# Patient Record
Sex: MALE | Employment: FULL TIME | URBAN - METROPOLITAN AREA
[De-identification: names, ages, dates, MRNs, and addresses within clinical notes are randomized per-mention and may not be internally consistent; named-entity substitution may affect disease eponyms.]

---

## 2020-12-01 ENCOUNTER — TRANSFERRED RECORDS (OUTPATIENT)
Dept: HEALTH INFORMATION MANAGEMENT | Facility: CLINIC | Age: 60
End: 2020-12-01

## 2020-12-08 ENCOUNTER — TRANSFERRED RECORDS (OUTPATIENT)
Dept: HEALTH INFORMATION MANAGEMENT | Facility: CLINIC | Age: 60
End: 2020-12-08

## 2021-03-25 ENCOUNTER — TRANSFERRED RECORDS (OUTPATIENT)
Dept: HEALTH INFORMATION MANAGEMENT | Facility: CLINIC | Age: 61
End: 2021-03-25

## 2021-04-08 ENCOUNTER — TRANSFERRED RECORDS (OUTPATIENT)
Dept: HEALTH INFORMATION MANAGEMENT | Facility: CLINIC | Age: 61
End: 2021-04-08

## 2021-04-20 ENCOUNTER — TRANSFERRED RECORDS (OUTPATIENT)
Dept: HEALTH INFORMATION MANAGEMENT | Facility: CLINIC | Age: 61
End: 2021-04-20

## 2021-05-04 ENCOUNTER — TRANSFERRED RECORDS (OUTPATIENT)
Dept: HEALTH INFORMATION MANAGEMENT | Facility: CLINIC | Age: 61
End: 2021-05-04

## 2021-05-04 ENCOUNTER — MEDICAL CORRESPONDENCE (OUTPATIENT)
Dept: HEALTH INFORMATION MANAGEMENT | Facility: CLINIC | Age: 61
End: 2021-05-04

## 2021-05-06 ENCOUNTER — TRANSCRIBE ORDERS (OUTPATIENT)
Dept: OTHER | Age: 61
End: 2021-05-06

## 2021-05-06 DIAGNOSIS — K43.9 SPIGELIAN HERNIA: Primary | ICD-10-CM

## 2021-05-07 NOTE — TELEPHONE ENCOUNTER
REFERRAL INFORMATION:    Referring Provider:  Dr. Preston Prince     Referring Clinic:  [facility]     Reason for Visit/Diagnosis: Incisional/ Spigelian Hernia        FUTURE VISIT INFORMATION:    Appointment Date: 5/10/2021    Appointment Time: 11 AM      NOTES RECORD STATUS  DETAILS   OFFICE NOTE from Referring Provider Care Everywhere 5/27/2020, 5/18/2020, 2/27/2020 Office visit with Dr. Prince      OFFICE NOTE from Other Specialists Care Everywhere 4/20/2021 Office visit with Katie Carlos PA-C ([facility])    3/25/2021 Office visit with Dr. Jairo Vernon ([facility])     12/1/2020, 10/2/2020, 12/18/19 Office visit with Maximiliano Kent PA-C ([facility]) - more office visits in     5/4/2020, 2/24/2020, 2/6/2020 Office visit with Dr. Andrew Leonard ([facility])     1/23/2020 Office visit with Dr. Concha Drew ([facility])      HOSPITAL DISCHARGE SUMMARY/ ED VISITS  Care Everywhere 5/9/2020 ([facility])      OPERATIVE REPORT Care Everywhere    ENDOSCOPY (EGD)  Care Everywhere Repair of incarcerated left inguinal hernia, repair of right Inguinal Hernia: 5/8/2020 ([facility])     PERTINENT LABS Care Everywhere    PATHOLOGY REPORTS (RELATED) N/A    IMAGING (CT, MRI, US, XR)  Care Everywhere [facility]:  - CT Abdomen Pelvis: 4/8/2021  - US Abdomen: 12/8/2020

## 2021-05-10 ENCOUNTER — OFFICE VISIT (OUTPATIENT)
Dept: SURGERY | Facility: CLINIC | Age: 61
End: 2021-05-10
Attending: SURGERY
Payer: COMMERCIAL

## 2021-05-10 ENCOUNTER — PRE VISIT (OUTPATIENT)
Dept: SURGERY | Facility: CLINIC | Age: 61
End: 2021-05-10

## 2021-05-10 VITALS
HEIGHT: 72 IN | DIASTOLIC BLOOD PRESSURE: 83 MMHG | OXYGEN SATURATION: 94 % | WEIGHT: 235 LBS | HEART RATE: 72 BPM | BODY MASS INDEX: 31.83 KG/M2 | TEMPERATURE: 98.4 F | SYSTOLIC BLOOD PRESSURE: 123 MMHG | RESPIRATION RATE: 18 BRPM

## 2021-05-10 DIAGNOSIS — K43.2 INCISIONAL HERNIA, WITHOUT OBSTRUCTION OR GANGRENE: Primary | ICD-10-CM

## 2021-05-10 PROCEDURE — 99203 OFFICE O/P NEW LOW 30 MIN: CPT | Performed by: SURGERY

## 2021-05-10 RX ORDER — ATORVASTATIN CALCIUM 40 MG/1
40 TABLET, FILM COATED ORAL
COMMUNITY
Start: 2021-04-12

## 2021-05-10 RX ORDER — TAMSULOSIN HYDROCHLORIDE 0.4 MG/1
CAPSULE ORAL
COMMUNITY
Start: 2021-01-11

## 2021-05-10 RX ORDER — METOPROLOL TARTRATE 50 MG
50 TABLET ORAL
COMMUNITY
Start: 2021-04-12

## 2021-05-10 RX ORDER — LISINOPRIL 20 MG/1
TABLET ORAL
COMMUNITY
Start: 2021-01-12

## 2021-05-10 RX ORDER — ALBUTEROL SULFATE 90 UG/1
2 AEROSOL, METERED RESPIRATORY (INHALATION)
COMMUNITY
Start: 2020-12-01

## 2021-05-10 SDOH — HEALTH STABILITY: MENTAL HEALTH: HOW MANY STANDARD DRINKS CONTAINING ALCOHOL DO YOU HAVE ON A TYPICAL DAY?: NOT ASKED

## 2021-05-10 SDOH — HEALTH STABILITY: MENTAL HEALTH: HOW OFTEN DO YOU HAVE 6 OR MORE DRINKS ON ONE OCCASION?: NOT ASKED

## 2021-05-10 SDOH — HEALTH STABILITY: MENTAL HEALTH: HOW OFTEN DO YOU HAVE A DRINK CONTAINING ALCOHOL?: NOT ASKED

## 2021-05-10 ASSESSMENT — MIFFLIN-ST. JEOR: SCORE: 1914.98

## 2021-05-10 ASSESSMENT — PAIN SCALES - GENERAL: PAINLEVEL: MILD PAIN (2)

## 2021-05-10 NOTE — PATIENT INSTRUCTIONS
You met with Dr. Tommy Preston.      Today's visit instructions:    Dr. Preston would like to review your case at our next Hernia Conference (last Wednesday of the month).  Our team will call you with the groups recommendations.     If you have questions please contact Kathleen YOUNG during regular clinic hours, Monday through Friday 7:30 AM - 4:00 PM, or you can contact us via [employer] at anytime.       If you have urgent needs after-hours, weekends, or holidays please call the hospital at [phone] and ask to speak with our on-call General Surgery Team.    Appointment [phone], option #1   Nurse Advice (Kathleen): [phone]   Surgery Scheduler (Ruthie): [phone]  Fax: [phone]

## 2021-05-10 NOTE — NURSING NOTE
"Chief Complaint   Patient presents with     Consult     Pt here for hernia consult       Vitals:    05/10/21 1105   BP: 123/83   BP Location: Left arm   Patient Position: Sitting   Cuff Size: Adult Large   Pulse: 72   Resp: 18   Temp: 98.4  F (36.9  C)   TempSrc: Oral   SpO2: 94%   Weight: 106.6 kg (235 lb)   Height: 1.822 m (5' 11.75\")       Body mass index is 32.09 kg/m .      EMILIANO DukesT                      "

## 2021-05-10 NOTE — PROGRESS NOTES
Brian Wyatt comes for a second opinion regarding the left flank hernia.  He underwent robotic assisted bilateral inguinal hernia repairs at [facility] in [place] last fall.  About 4 weeks postop he noticed some bulging in the left.  It is continued since it causes some discomfort with motion.  One note says that he feels a football when he bends over on the left side.  A CT scan was obtained that shows a hernia on the left that protrudes through the internal oblique and through the layers of the oblique muscles.  It appears that the external oblique is probably intact.  This looks to be lateral to the and intact left rectus abdominis muscle.    He saw the primary surgeon Dr. Prince who recommended a laparoscopic or robotic approach to repair with mesh from his note it appears that it would be an intraperitoneal onlay mesh.  Patient was sent here for second opinion by  for our hernia group.    Dr. Dominique's operative note reports that the peritoneal flap was made cephalad to the left arcuate line and that the internal oblique was taken down as part of the flap.  It is unclear to me what this means.    A/P: Complex incisional hernia after a robotic assisted transperitoneal  left inguinal hernia repair.  I will present this at our multidisciplinary hernia group for discussion.  An intraperitoneal onlay (IPOM) mesh will still result in a weakened abdominal wall.  I think will be difficult to secure.  We will also still result in a bulge most likely.  An alternative would be an attempt to reconstruct the abdominal wall layers probably with a sandwich of mesh.  This is what I would discuss with her hernia group.  Seems to me like the approach for this would be open as it would be hard to dissect both sides from from 1 laparoscopic approach.    I spent 40 minutes in conversation with the patient and reviewing medical records.    Ernst Preston MD    No past medical history on file.    No past surgical  "history on file.       Allergies   Allergen Reactions     Other Environmental Allergy Itching     Cats and dogs     Penicillins      Pravastatin      myalgias         Current Outpatient Medications:      albuterol (PROAIR HFA/PROVENTIL HFA/VENTOLIN HFA) 108 (90 Base) MCG/ACT inhaler, Inhale 2 puffs into the lungs, Disp: , Rfl:      atorvastatin (LIPITOR) 40 MG tablet, Take 40 mg by mouth, Disp: , Rfl:      lisinopril (ZESTRIL) 20 MG tablet, TAKE 1 TABLET BY MOUTH EVERY DAY, Disp: , Rfl:      metoprolol tartrate (LOPRESSOR) 50 MG tablet, Take 50 mg by mouth, Disp: , Rfl:      psyllium (METAMUCIL SMOOTH TEXTURE) 28 % packet, Take 1 packet by mouth, Disp: , Rfl:      tamsulosin (FLOMAX) 0.4 MG capsule, TAKE ONE CAPSULE BY MOUTH DAILY, Disp: , Rfl:     family history is not on file.    Social History     Tobacco Use     Smoking status: Former Smoker     Smokeless tobacco: Current User     Types: Chew   Substance Use Topics     Alcohol use: Not Currently     Drug use: Not Currently       EXAM  /83 (BP Location: Left arm, Patient Position: Sitting, Cuff Size: Adult Large)   Pulse 72   Temp 98.4  F (36.9  C) (Oral)   Resp 18   Ht 1.822 m (5' 11.75\")   Wt 106.6 kg (235 lb)   SpO2 94%   BMI 32.09 kg/m      Large abdomen, obvious bulging on left.  Incision sites healed.   No palp inguinal hernias  "

## 2021-05-26 ENCOUNTER — TEAM CONFERENCE (OUTPATIENT)
Dept: SURGERY | Facility: CLINIC | Age: 61
End: 2021-05-26

## 2021-05-26 NOTE — TELEPHONE ENCOUNTER
"Complex Hernia Monthly Conference Note   Date: May 26, 2021    Attendees: Dr. Quick, Dr. Hodge, Dr. Preston, Dr. Alamo, Dr. Miles and Kathleen Lyle RN, Chelle Rosado RN, Melina Velasco RN    Type of hernia: spigelian    Estimated body mass index is 32.09 kg/m  as calculated from the following:    Height as of 5/10/21: 1.822 m (5' 11.75\").    Weight as of 5/10/21: 106.6 kg (235 lb).    Wt Readings from Last 4 Encounters:   05/10/21 106.6 kg (235 lb)       History   Smoking Status     Former Smoker   Smokeless Tobacco     Current User     Types: Chew       Weight at time of initial consult: 235lb    HgbA1C: No results found for: A1C    No results found for: ALBUMIN    Is this a re-occurrence of hernia: YES    Is mesh in place: YES   Has there been multiple abdominal surgeries/previous repair: No    Fistulas: No  Is hernia is greater than 5 cm: unknown  Multiple hernias present: No  Is patient immunosuppressed: No  PAST MEDICAL HISTORY: No past medical history on file.    PAST SURGICAL HISTORY: No past surgical history on file.    Patient Plan:    CT reviewed: YES    Nictotine/Continine Test needed: NO        Patient will be contacted by Chelle Rosado RN regarding plan of care.   Spoke with patient and video visit scheduled for 6/14/21 at 0830 with Dr. Preston.       "

## 2021-06-11 ENCOUNTER — PATIENT OUTREACH (OUTPATIENT)
Dept: SURGERY | Facility: CLINIC | Age: 61
End: 2021-06-11

## 2021-06-11 NOTE — PROGRESS NOTES
Patient Telephone Reminder Call    Date of call:  06/11/21  Phone numbers:  Cell number on file:    Telephone Information:   Mobile [phone]       Reached patient/confirmed appointment:  No - left message:   on voicemail  Appointment with:   Dr. Tommy Preston  Reason for visit:  Hernia

## 2021-06-15 ENCOUNTER — PATIENT OUTREACH (OUTPATIENT)
Dept: SURGERY | Facility: CLINIC | Age: 61
End: 2021-06-15

## 2021-06-15 ENCOUNTER — TELEPHONE (OUTPATIENT)
Dept: SURGERY | Facility: CLINIC | Age: 61
End: 2021-06-15

## 2021-06-15 NOTE — TELEPHONE ENCOUNTER
Pt called and left a VM yesterday (at 3:55pm) stating he slept through 2 alarm clocks and missed his virtual appt with Dr. Preston.     Pt would like a call back to reschedule appt.      Mathieu Zelaya LPN

## 2021-06-15 NOTE — PROGRESS NOTES
Called patient to reschedule video visit with Dr. Preston. Scheduled and confirmed appointment with patient for 6/21/21 at 2:30pm.

## 2021-06-18 ENCOUNTER — PATIENT OUTREACH (OUTPATIENT)
Dept: SURGERY | Facility: CLINIC | Age: 61
End: 2021-06-18

## 2021-06-18 NOTE — PROGRESS NOTES
Pre Visit Call and Assessment    Date of call:  06/18/2021    Phone numbers:  Cell number on file:    Telephone Information:   Mobile [phone]       Reached patient/confirmed appointment:  No - left message:   on cell phone  Patient care team/Primary provider:  Maximiliano Kent  Preferred outpatient pharmacy:    [id number] [address] AT [facility] [address]  [address] [facility] [address]  Phone: [phone] Fax: [phone]    Referred to:  Dr. Tommy Preston    Reason for visit:  Discuss hernia

## 2021-06-21 ENCOUNTER — VIRTUAL VISIT (OUTPATIENT)
Dept: SURGERY | Facility: CLINIC | Age: 61
End: 2021-06-21
Payer: COMMERCIAL

## 2021-06-21 VITALS — HEIGHT: 72 IN | BODY MASS INDEX: 32.51 KG/M2 | WEIGHT: 240 LBS

## 2021-06-21 DIAGNOSIS — K43.2 INCISIONAL HERNIA, WITHOUT OBSTRUCTION OR GANGRENE: Primary | ICD-10-CM

## 2021-06-21 PROCEDURE — 99212 OFFICE O/P EST SF 10 MIN: CPT | Mod: 95 | Performed by: SURGERY

## 2021-06-21 ASSESSMENT — MIFFLIN-ST. JEOR: SCORE: 1932.66

## 2021-06-21 ASSESSMENT — PAIN SCALES - GENERAL: PAINLEVEL: MILD PAIN (2)

## 2021-06-21 NOTE — PATIENT INSTRUCTIONS
You met with Dr. Tommy Preston.      Today's visit instructions:    Please contact the General Surgery Clinic when/if you decide you would like to proceed with hernia surgery.        If you have questions please contact Kathleen RN, Chelle RN, or Mack RN during regular clinic hours, Monday through Friday 7:30 AM - 4:00 PM, or you can contact us via [employer] at anytime.       If you have urgent needs after-hours, weekends, or holidays please call the hospital at [phone] and ask to speak with our on-call General Surgery Team.    Appointment [phone], option #1   Nurse Advice (Chelle Wang, or Mack): [phone]   Surgery Scheduler (Ruthie): [phone]  Fax: [phone]

## 2021-06-21 NOTE — NURSING NOTE
"Chief Complaint   Patient presents with     Video Visit     discuss hernia options       Vitals:    06/21/21 1404   Weight: 240 lb   Height: 5' 11.75\"       Body mass index is 32.78 kg/m .    Francisca Hickey CMA    "

## 2021-06-21 NOTE — PROGRESS NOTES
Phone call visit. Video call didn't work for the patient.  DIscussed the consensus of the hernia conference that an open repair with attempt to reconnect the transversalis muscle with layering of a piece of mesh between layers.    Risks discussed are bleeding, infection, and lack of function/recurrence.  Also discussed that it is in unchartered territory.   He asked what caused this.  I said it was a result of the previous hernia surgery.  The operative note reads that the transversalis was taken down.    I explained we would attempt this open with an oblique incision.  Also that I couldn't guarantee the random, shooting pain into his testicle or leg would be relieved.   He will discuss with his family and contact our office.  Procedure: complex abdominal wall reconstruction, ventral incisional hernia repair.  Will be open. [place] OR.  Likely postop admission due to the extent of dissection. PCP to do history. Would need 4 hours.

## 2021-06-21 NOTE — LETTER
6/21/2021       RE: Brian Wyatt  [address] [place] [id number]     Dear Colleague,    Thank you for referring your patient, Brian Wyatt, to the [facility] [place] [facility]. Please see a copy of my visit note below.    Phone call visit. Video call didn't work for the patient.  DIscussed the consensus of the hernia conference that an open repair with attempt to reconnect the transversalis muscle with layering of a piece of mesh between layers.    Risks discussed are bleeding, infection, and lack of function/recurrence.  Also discussed that it is in unchartered territory.   He asked what caused this.  I said it was a result of the previous hernia surgery.  The operative note reads that the transversalis was taken down.    I explained we would attempt this open with an oblique incision.  Also that I couldn't guarantee the random, shooting pain into his testicle or leg would be relieved.   He will discuss with his family and contact our office.  Procedure: complex abdominal wall reconstruction, ventral incisional hernia repair.  Will be open. [place] OR.  Likely postop admission due to the extent of dissection. PCP to do history. Would need 4 hours.        Again, thank you for allowing me to participate in the care of your patient.      Sincerely,    Ernst Preston MD